# Patient Record
Sex: FEMALE | Race: WHITE | NOT HISPANIC OR LATINO | Employment: STUDENT | URBAN - METROPOLITAN AREA
[De-identification: names, ages, dates, MRNs, and addresses within clinical notes are randomized per-mention and may not be internally consistent; named-entity substitution may affect disease eponyms.]

---
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
## 2025-04-11 ENCOUNTER — TELEPHONE (OUTPATIENT)
Dept: NEUROLOGY | Facility: CLINIC | Age: 20
End: 2025-04-11
Payer: COMMERCIAL

## 2025-04-11 NOTE — TELEPHONE ENCOUNTER
Spoke to Pt. Pt stated she need her Topamax filled and was unable to through the [facility]. Pt was informed that she has to be seen within a year to continue receiving refills. Pt was informed of two options: 1. Pt can request refills from her PCP or 2. I can schedule her with a headache provider. Pt stated she is away at school in [place]. I informed Pt that we can schedule a virtual visit. Pt stated that would be great. Pt's virtual visit is scheduled for April 25 with . I have also sent the Pt instructions on how to log in and complete the virtual visit.

## 2025-04-11 NOTE — TELEPHONE ENCOUNTER
----- Message from Magali Calderon sent at 4/11/2025  7:34 AM CDT -----  Hello, This patient needs to be scheduled in resident's clinic to get a new prescription. I already sent a 3 month supply and July and asked for there to be scheduled. If not with residents, then with headache.  ----- Message -----  From: Gayle Fonseca MA  Sent: 4/10/2025  10:14 AM CDT  To: Magali Calderon MD      ----- Message -----  From: Isela Rome  Sent: 4/10/2025   9:20 AM CDT  To: Leonor [facility]CHR[facility] Staff    Type:  RX Refill RequestWho Called:  Pt  motherRefill or New Rx: refillRX Name and Strength topomaxHow is the patient currently taking it? (ex. 1XDay): 1Is this a 30 day or 90 day RX: [facility] Pharm[facility] with phone [facility] or [facility] Order: localOrdering Provider: Dr Bedollaould the patient rather a call back or a response via [facility]?  Call [facility] Call Back Number:[phone] Information:

## 2025-04-14 ENCOUNTER — TELEPHONE (OUTPATIENT)
Dept: NEUROLOGY | Facility: CLINIC | Age: 20
End: 2025-04-14
Payer: COMMERCIAL

## 2025-04-15 NOTE — TELEPHONE ENCOUNTER
----- Message from Med Assistant Gayle sent at 4/11/2025  9:43 AM CDT -----  Please assist with scheduling the pt an appt with resident clinic.  ----- Message -----  From: Magali Lucia MD  Sent: 4/11/2025   7:35 AM CDT  To: Sofia King MA; BELEM Huitron, This patient needs to be scheduled in resident's clinic to get a new prescription. I already sent a 3 month supply and July and asked for there to be scheduled. If not with residents, then with headache.  ----- Message -----  From: Gayle Fonseca MA  Sent: 4/10/2025  10:14 AM CDT  To: Magali Calderon MD      ----- Message -----  From: Isela Rome  Sent: 4/10/2025   9:20 AM CDT  To: Leonor COCHRAN Staff    Type:  RX Refill RequestWho Called:  Pt  motherRefill or New Rx: refillRX Name and Strength topomaxHow is the patient currently taking it? (ex. 1XDay): 1Is this a 30 day or 90 day RX: [facility] or [facility] Order: localOrdering Provider: Dr Bedollaould the patient rather a call back or a response via [facility]?  Call [facility] Call Back Number:[phone] Information:

## 2025-04-25 ENCOUNTER — OFFICE VISIT (OUTPATIENT)
Facility: CLINIC | Age: 20
End: 2025-04-25
Payer: COMMERCIAL

## 2025-04-25 ENCOUNTER — TELEPHONE (OUTPATIENT)
Dept: PHARMACY | Facility: CLINIC | Age: 20
End: 2025-04-25
Payer: COMMERCIAL

## 2025-04-25 DIAGNOSIS — G43.109 COMPLICATED MIGRAINE: ICD-10-CM

## 2025-04-25 DIAGNOSIS — G43.809 VESTIBULAR MIGRAINE: ICD-10-CM

## 2025-04-25 DIAGNOSIS — G43.E09 CHRONIC MIGRAINE WITH AURA WITHOUT STATUS MIGRAINOSUS, NOT INTRACTABLE: Primary | ICD-10-CM

## 2025-04-25 RX ORDER — TOPIRAMATE 25 MG/1
75 TABLET ORAL DAILY
Qty: 90 TABLET | Refills: 11 | Status: SHIPPED | OUTPATIENT
Start: 2025-04-25 | End: 2026-04-25

## 2025-04-25 RX ORDER — RIMEGEPANT SULFATE 75 MG/75MG
75 TABLET, ORALLY DISINTEGRATING ORAL ONCE AS NEEDED
COMMUNITY
End: 2025-04-25 | Stop reason: SDUPTHER

## 2025-04-25 RX ORDER — RIMEGEPANT SULFATE 75 MG/75MG
75 TABLET, ORALLY DISINTEGRATING ORAL DAILY PRN
Qty: 8 TABLET | Refills: 11 | Status: SHIPPED | OUTPATIENT
Start: 2025-04-25

## 2025-04-25 NOTE — PROGRESS NOTES
Currently we are unable to assist with PAP enrollment for the following reason(s):      Private and/or commercial coverage visible in chart. Patient may be eligible for a co-pay card, may reach out to number provided for assistance with obtaining a co-pay card.         Tuyet ARANGO @[phone]  Pharmacy Patient Assistance Team

## 2025-04-25 NOTE — PROGRESS NOTES
[facility] VIRTUAL VISIT        Referring Provider: Dr. Magali Richter*       The patient location is: [place]  The chief complaint leading to consultation is: headache    Visit type: audiovisual      SUBJECTIVE:    History for Present Illness: Nayeli Rico is a 19 y.o.  female  with PMHX of migraine and multiple concussions who presents to me in virtual clinic today for initial assessment and recommendations for migraine. Patient states she wants to discuss migraine medication doses. Has had 2 concussions since last visit and subsequently had an increase in HA frequency and intensity. Most of her concussions have occurred from cheerleading. Has has a concussion from accidents and one from gymnastics. Reports no side effects from topamax. Nurtec is very effective in treating breakthrough migraine.      HA History  Onset: post concussion, 2017  Previous history of HA yes  Location- frontal or occipital  Radiation-yes to holocephalic  Severity Range: 8/10  Duration of HA- hours  Frequency 4-5/30 ha days per month  Triggers: lights and noises  Last HA: last week  Aura: yes, ring/halo in eye  Associated factors WITH HA: Nausea, Vomiting, Photophobia, Phonophobia, Vision Changes, and dizziness  Recent Changes - none      Positives in bold: Hx of TBI (concussion x 4), CNS infection, aneurysms, Smoking, Kidney Stones, asthma, GI bleed, osteoporosis, CAD/MI, CVA/TIA, DM          Current medications:  Magnesium  Topamax  nurtec    Therapies tried in past:  Amitriptyline - not effect, side effects  Topamax   Magnesium  Riboflavin   nurtec      Past Medical History:   Diagnosis Date    Concussion 01/19/2021       Past Surgical History:   Procedure Laterality Date    ARTHROSCOPY OF KNEE Right 7/15/2021    Procedure: ARTHROSCOPY, KNEE, PARTIAL LATERAL MENISECTOMY, PLICA EXCISION;  Surgeon: Kelle Reynolds MD;  Location: [facility];  Service: Orthopedics;  Laterality: Right;  ARTHROSCOPY, KNEE, Lysis of Adhesions     SYNOVECTOMY OF KNEE Right 7/15/2021    Procedure: SYNOVECTOMY, KNEE;  Surgeon: Kelle Reynolds MD;  Location: [facility];  Service: Orthopedics;  Laterality: Right;       No family history on file.     Current Medications[1]       Review of Systems:   12 system review of systems is negative except for the symptoms mentioned in HPI.       OBJECTIVE:    Focused telemedicine examination was undertaken today.     Neurologic Exam (headache focused):  Patient is awake, alert and oriented to person, place and time.   Attention and concentration within normal limits. Speech without dysarthria, able to name and repeat without difficulty. Recent and remote memory within normal limits                                                                                                                                                    Relevant Labwork:        Diagnostic Results:    I have personally reviewed the imaging studies and report in the results documented below    MRI Brain 1/11/24  Impression:     Cerebral white matter signal alterations, right greater than left, are favored to represent prominent perivascular spaces.     Otherwise, at this time there is no scan evidence of demyelinating process in the brain.  Correlate clinically.  If clinically unexplained signs or symptoms persist or recur, follow-up MR imaging would be advised.      MRA Brain and Neck 2/3/24  Impression:     Tortuosity with redundant vascular loops the distal cervical ICAs bilaterally.     Otherwise unremarkable appearance of the cervical and intracranial vasculature as above.     Assessment/Plan:  Nayeli Rico is a 19 y.o. female  with PMHX of migraine and multiple concussions who presents to me in virtual clinic today for initial assessment and recommendations for migraine. Has had an increase in HA frequency and intensity since recent concussions in fall 2024. Concussions occurred from cheerleading. Previous MRI and MRA reviewed and discussed with staff  (Jerry Diaz MD). Prominent perivascular spaces noted on imaging, finding likely incidental.    Due to history of transient neurologic symptoms associated with migraine, triptans are medically contraindicated in this patient. Therefore, it is medically necessary for her to continue nurtec for breakthrough migraine.      PLAN  -increase topamax to 75 mg daily  -continue nurtec  -continue magnesium  -RTC in 12 months or sooner if needed        Therapies tried in past:  Amitriptyline - not effect, side effects  Topamax   Magnesium  Riboflavin   nurtec      1. Chronic migraine with aura without status migrainosus, not intractable  -     rimegepant (NURTEC) 75 mg odt; Take 1 tablet (75 mg total) by mouth daily as needed for Migraine. Place ODT tablet on the tongue; alternatively the ODT tablet may be placed under the tongue  Dispense: 8 tablet; Refill: 11  -     topiramate (TOPAMAX) 25 MG tablet; Take 3 tablets (75 mg total) by mouth once daily.  Dispense: 90 tablet; Refill: 11    2. Vestibular migraine  -     rimegepant (NURTEC) 75 mg odt; Take 1 tablet (75 mg total) by mouth daily as needed for Migraine. Place ODT tablet on the tongue; alternatively the ODT tablet may be placed under the tongue  Dispense: 8 tablet; Refill: 11    3. Complicated migraine  -     rimegepant (NURTEC) 75 mg odt; Take 1 tablet (75 mg total) by mouth daily as needed for Migraine. Place ODT tablet on the tongue; alternatively the ODT tablet may be placed under the tongue  Dispense: 8 tablet; Refill: 11             Face to Face time with patient: 18 minutes    Each patient to whom he or she provides medical services by telemedicine is:  (1) informed of the relationship between the provider and patient and the respective role of any other health care provider with respect to management of the patient; and (2) notified that he or she may decline to receive medical services by telemedicine and may withdraw from such care at any  time.          I will plan on having Nayeli return to clinic 12 months.  The patient can contact my office with any questions or concerns they may have as they arise in the interim.         Amanda Galo PA-C  Department of Neurology  [facility]             [1]   Current Outpatient Medications:     busPIRone (BUSPAR) 5 MG Tab, Take 5 mg by mouth 2 (two) times daily., Disp: , Rfl:     rimegepant (NURTEC) 75 mg odt, Take 1 tablet (75 mg total) by mouth daily as needed for Migraine. Place ODT tablet on the tongue; alternatively the ODT tablet may be placed under the tongue, Disp: 8 tablet, Rfl: 11    topiramate (TOPAMAX) 25 MG tablet, Take 3 tablets (75 mg total) by mouth once daily., Disp: 90 tablet, Rfl: 11

## 2025-04-25 NOTE — TELEPHONE ENCOUNTER
----- Message from Caleb Benz sent at 4/25/2025 11:12 AM CDT -----  Regarding: RE: Order for GENESIS BAILEY  [facility]? [facility]  ----- Message -----  From: Amanda Galo PA-C  Sent: 4/25/2025   8:32 AM CDT  To: Pharmacy Patient Assistance Team  Subject: Order for GENESIS BAILEY

## 2025-05-31 ENCOUNTER — PATIENT MESSAGE (OUTPATIENT)
Facility: CLINIC | Age: 20
End: 2025-05-31
Payer: COMMERCIAL

## 2025-06-12 DIAGNOSIS — F90.9 ATTENTION DEFICIT HYPERACTIVITY DISORDER (ADHD), UNSPECIFIED ADHD TYPE: Primary | ICD-10-CM

## (undated) DEVICE — PAD ABD 8X10 STERILE

## (undated) DEVICE — GAUZE SPONGE 4X4 12PLY

## (undated) DEVICE — SOL 9P NACL IRR PIC IL

## (undated) DEVICE — SLEEVE PROTECTIVE 6X9 NON STER

## (undated) DEVICE — SEE MEDLINE ITEM 157117

## (undated) DEVICE — PUMP COLD THERAPY

## (undated) DEVICE — PAD COLD THERAPY KNEE WRAP ON

## (undated) DEVICE — COVER MAYO STAND REINFRCD 30

## (undated) DEVICE — BLADE 4.2MM PREBENT ULTRACUT

## (undated) DEVICE — Device

## (undated) DEVICE — APPLICATOR CHLORAPREP ORN 26ML

## (undated) DEVICE — ADHESIVE MASTISOL VIAL 48/BX

## (undated) DEVICE — SUT MCRYL PLUS 4-0 PS2 27IN

## (undated) DEVICE — UNDERGLOVES BIOGEL PI SIZE 7.5

## (undated) DEVICE — SOL IRR NACL .9% 3000ML

## (undated) DEVICE — GOWN SMARTGOWN LVL4 X-LONG XL

## (undated) DEVICE — PAD CAST SPECIALIST STRL 6

## (undated) DEVICE — GLOVE BIOGEL SKINSENSE PI 7.0

## (undated) DEVICE — CLOSURE SKIN STERI STRIP 1/2X4

## (undated) DEVICE — NDL 25G X 5/8 REG BEVEL

## (undated) DEVICE — PAD ELECTRODE STER 1.5X3

## (undated) DEVICE — DRESSING XEROFORM FOIL PK 1X8

## (undated) DEVICE — SYR 10CC LUER LOCK

## (undated) DEVICE — GOWN SMART IMP BREATHABLE XXLG

## (undated) DEVICE — TUBE SET INFLOW/OUTFLOW

## (undated) DEVICE — GLOVE ORTHO PF SZ 8.5

## (undated) DEVICE — PROBE ARTHO ENERGY 90 DEG

## (undated) DEVICE — GLOVE SURGEON SYN PF SZ 9

## (undated) DEVICE — SEE MEDLINE ITEM 157169